# Patient Record
Sex: FEMALE | Race: OTHER | HISPANIC OR LATINO | URBAN - METROPOLITAN AREA
[De-identification: names, ages, dates, MRNs, and addresses within clinical notes are randomized per-mention and may not be internally consistent; named-entity substitution may affect disease eponyms.]

---

## 2021-08-17 ENCOUNTER — EMERGENCY (EMERGENCY)
Facility: HOSPITAL | Age: 41
LOS: 1 days | Discharge: HOME | End: 2021-08-17
Attending: EMERGENCY MEDICINE | Admitting: EMERGENCY MEDICINE
Payer: COMMERCIAL

## 2021-08-17 VITALS
RESPIRATION RATE: 17 BRPM | TEMPERATURE: 98 F | SYSTOLIC BLOOD PRESSURE: 104 MMHG | HEART RATE: 75 BPM | OXYGEN SATURATION: 99 % | DIASTOLIC BLOOD PRESSURE: 70 MMHG

## 2021-08-17 VITALS
OXYGEN SATURATION: 98 % | DIASTOLIC BLOOD PRESSURE: 75 MMHG | WEIGHT: 179.9 LBS | TEMPERATURE: 99 F | HEIGHT: 67 IN | SYSTOLIC BLOOD PRESSURE: 108 MMHG | RESPIRATION RATE: 18 BRPM | HEART RATE: 75 BPM

## 2021-08-17 DIAGNOSIS — R10.2 PELVIC AND PERINEAL PAIN: ICD-10-CM

## 2021-08-17 LAB
ALBUMIN SERPL ELPH-MCNC: 4.6 G/DL — SIGNIFICANT CHANGE UP (ref 3.3–5)
ALP SERPL-CCNC: 74 U/L — SIGNIFICANT CHANGE UP (ref 40–120)
ALT FLD-CCNC: 17 U/L — SIGNIFICANT CHANGE UP (ref 10–45)
ANION GAP SERPL CALC-SCNC: 10 MMOL/L — SIGNIFICANT CHANGE UP (ref 5–17)
APPEARANCE UR: CLEAR — SIGNIFICANT CHANGE UP
AST SERPL-CCNC: 18 U/L — SIGNIFICANT CHANGE UP (ref 10–40)
BACTERIA # UR AUTO: PRESENT /HPF
BASOPHILS # BLD AUTO: 0.04 K/UL — SIGNIFICANT CHANGE UP (ref 0–0.2)
BASOPHILS NFR BLD AUTO: 0.5 % — SIGNIFICANT CHANGE UP (ref 0–2)
BILIRUB SERPL-MCNC: 0.4 MG/DL — SIGNIFICANT CHANGE UP (ref 0.2–1.2)
BILIRUB UR-MCNC: NEGATIVE — SIGNIFICANT CHANGE UP
BUN SERPL-MCNC: 14 MG/DL — SIGNIFICANT CHANGE UP (ref 7–23)
CALCIUM SERPL-MCNC: 9.3 MG/DL — SIGNIFICANT CHANGE UP (ref 8.4–10.5)
CHLORIDE SERPL-SCNC: 103 MMOL/L — SIGNIFICANT CHANGE UP (ref 96–108)
CO2 SERPL-SCNC: 23 MMOL/L — SIGNIFICANT CHANGE UP (ref 22–31)
COLOR SPEC: YELLOW — SIGNIFICANT CHANGE UP
COMMENT - URINE: SIGNIFICANT CHANGE UP
CREAT SERPL-MCNC: 0.72 MG/DL — SIGNIFICANT CHANGE UP (ref 0.5–1.3)
DIFF PNL FLD: NEGATIVE — SIGNIFICANT CHANGE UP
EOSINOPHIL # BLD AUTO: 0.17 K/UL — SIGNIFICANT CHANGE UP (ref 0–0.5)
EOSINOPHIL NFR BLD AUTO: 2 % — SIGNIFICANT CHANGE UP (ref 0–6)
EPI CELLS # UR: ABNORMAL /HPF (ref 0–5)
GLUCOSE SERPL-MCNC: 99 MG/DL — SIGNIFICANT CHANGE UP (ref 70–99)
GLUCOSE UR QL: NEGATIVE — SIGNIFICANT CHANGE UP
HCG SERPL-ACNC: 95 MIU/ML — HIGH
HCT VFR BLD CALC: 35.3 % — SIGNIFICANT CHANGE UP (ref 34.5–45)
HGB BLD-MCNC: 11.7 G/DL — SIGNIFICANT CHANGE UP (ref 11.5–15.5)
IMM GRANULOCYTES NFR BLD AUTO: 0.6 % — SIGNIFICANT CHANGE UP (ref 0–1.5)
KETONES UR-MCNC: NEGATIVE — SIGNIFICANT CHANGE UP
LEUKOCYTE ESTERASE UR-ACNC: NEGATIVE — SIGNIFICANT CHANGE UP
LYMPHOCYTES # BLD AUTO: 2.18 K/UL — SIGNIFICANT CHANGE UP (ref 1–3.3)
LYMPHOCYTES # BLD AUTO: 25.7 % — SIGNIFICANT CHANGE UP (ref 13–44)
MCHC RBC-ENTMCNC: 28.8 PG — SIGNIFICANT CHANGE UP (ref 27–34)
MCHC RBC-ENTMCNC: 33.1 GM/DL — SIGNIFICANT CHANGE UP (ref 32–36)
MCV RBC AUTO: 86.9 FL — SIGNIFICANT CHANGE UP (ref 80–100)
MONOCYTES # BLD AUTO: 0.49 K/UL — SIGNIFICANT CHANGE UP (ref 0–0.9)
MONOCYTES NFR BLD AUTO: 5.8 % — SIGNIFICANT CHANGE UP (ref 2–14)
NEUTROPHILS # BLD AUTO: 5.55 K/UL — SIGNIFICANT CHANGE UP (ref 1.8–7.4)
NEUTROPHILS NFR BLD AUTO: 65.4 % — SIGNIFICANT CHANGE UP (ref 43–77)
NITRITE UR-MCNC: NEGATIVE — SIGNIFICANT CHANGE UP
NRBC # BLD: 0 /100 WBCS — SIGNIFICANT CHANGE UP (ref 0–0)
PH UR: 6 — SIGNIFICANT CHANGE UP (ref 5–8)
PLATELET # BLD AUTO: 334 K/UL — SIGNIFICANT CHANGE UP (ref 150–400)
POTASSIUM SERPL-MCNC: 4 MMOL/L — SIGNIFICANT CHANGE UP (ref 3.5–5.3)
POTASSIUM SERPL-SCNC: 4 MMOL/L — SIGNIFICANT CHANGE UP (ref 3.5–5.3)
PROT SERPL-MCNC: 8.1 G/DL — SIGNIFICANT CHANGE UP (ref 6–8.3)
PROT UR-MCNC: 30 MG/DL
RBC # BLD: 4.06 M/UL — SIGNIFICANT CHANGE UP (ref 3.8–5.2)
RBC # FLD: 12.9 % — SIGNIFICANT CHANGE UP (ref 10.3–14.5)
RBC CASTS # UR COMP ASSIST: < 5 /HPF — SIGNIFICANT CHANGE UP
SODIUM SERPL-SCNC: 136 MMOL/L — SIGNIFICANT CHANGE UP (ref 135–145)
SP GR SPEC: >=1.03 — SIGNIFICANT CHANGE UP (ref 1–1.03)
UROBILINOGEN FLD QL: 0.2 E.U./DL — SIGNIFICANT CHANGE UP
WBC # BLD: 8.48 K/UL — SIGNIFICANT CHANGE UP (ref 3.8–10.5)
WBC # FLD AUTO: 8.48 K/UL — SIGNIFICANT CHANGE UP (ref 3.8–10.5)
WBC UR QL: < 5 /HPF — SIGNIFICANT CHANGE UP

## 2021-08-17 PROCEDURE — 36415 COLL VENOUS BLD VENIPUNCTURE: CPT

## 2021-08-17 PROCEDURE — 85025 COMPLETE CBC W/AUTO DIFF WBC: CPT

## 2021-08-17 PROCEDURE — 99284 EMERGENCY DEPT VISIT MOD MDM: CPT | Mod: 25

## 2021-08-17 PROCEDURE — 87086 URINE CULTURE/COLONY COUNT: CPT

## 2021-08-17 PROCEDURE — 76801 OB US < 14 WKS SINGLE FETUS: CPT

## 2021-08-17 PROCEDURE — 76801 OB US < 14 WKS SINGLE FETUS: CPT | Mod: 26

## 2021-08-17 PROCEDURE — 99285 EMERGENCY DEPT VISIT HI MDM: CPT

## 2021-08-17 PROCEDURE — 84702 CHORIONIC GONADOTROPIN TEST: CPT

## 2021-08-17 PROCEDURE — 76830 TRANSVAGINAL US NON-OB: CPT

## 2021-08-17 PROCEDURE — 76817 TRANSVAGINAL US OBSTETRIC: CPT | Mod: 26

## 2021-08-17 PROCEDURE — 80053 COMPREHEN METABOLIC PANEL: CPT

## 2021-08-17 PROCEDURE — 81001 URINALYSIS AUTO W/SCOPE: CPT

## 2021-08-17 NOTE — ED PROVIDER NOTE - OBJECTIVE STATEMENT
41 F  p/w lower abd pain x 2 days.  She is c/o constant dull aching lower abd pain without radiation.  Also mild dysuria; no vaginal bleeding or discharge.  She reports missed menses so took 4 home preg tests which were positive.  LMP 7/5.  Denies f/c, headache, dizziness, fainting, chest pain, sob, nvd, constipation, hematuria, trauma.

## 2021-08-17 NOTE — ED PROVIDER NOTE - CARE PROVIDER_API CALL
Chavo Higginbotham)  OBGYN  [address], [place], [place] [address]  Phone: [phone]  Fax: [phone]  Follow Up Time:     Danae Coffman AND SOFIYA UP [facility]  [address] [address]  Phone: [phone]  Fax: [phone]  Follow Up Time:

## 2021-08-17 NOTE — ED ADULT TRIAGE NOTE - OTHER COMPLAINTS
patient c/o lower abdominal pain x 2 days, reports recent positive pregnancy test-- also c/o R ear pain --denies vaginal bleeding/spotting

## 2021-08-17 NOTE — ED PROVIDER NOTE - PROGRESS NOTE DETAILS
labs w/ hcg 95, otherwise wnl.  sonogram consistent with pregnancy of unknown location.  likely early preg based on hcg level.  pt pain free and no bleeding.  will have pt return in 48 hours for rpt quant.  discussed strict return parameters

## 2021-08-17 NOTE — ED PROVIDER NOTE - NSFOLLOWUPINSTRUCTIONS_ED_ALL_ED_FT
Please return in 2 days for repeat blood tests  Return sooner if you have worsening pain, vaginal bleeding or other concerns      Abdominal Pain in Pregnancy    WHAT YOU NEED TO KNOW:    Abdominal pain during pregnancy is common. Some of the causes include heartburn, constipation, gas, false labor, and round ligament pain. Round ligament pain is caused by stretching of the ligaments that support your uterus. Abdominal pain may be caused by a health problem, such as a stomach virus or appendicitis (inflammation of the appendix). The pain may also be caused by a problem with your pregnancy, such as a threatened miscarriage or  labor.    DISCHARGE INSTRUCTIONS:    Call your local emergency number (911 in the ) if:   •You have a fast heartbeat.      •You have shortness of breath.      •You feel lightheaded or faint.      Return to the emergency department if:   •You have sudden, severe pain or cramps that are so bad that you cannot walk or talk.      •You have vaginal bleeding or discharge.      •You have nausea, vomiting, fever, and severe pain on your right side.      Call your obstetrician if:   •You have light vaginal bleeding or spotting.      •You continue to have abdominal pain that cannot be relieved.      •You have a fever.      •You have questions or concerns about your condition or care.      Medicines: Ask your healthcare provider before you take any medicine during pregnancy, including over-the-counter pain medicines.  •Acetaminophen may be recommended. Ask how much to take and how often to take it. Follow directions. Acetaminophen can cause liver damage if not taken correctly. Do not use more than 4 grams (4,000 milligrams) total of acetaminophen in 1 day. Acetaminophen can cause liver damage. Your provider will tell you how much is safe to take each day during pregnancy. Too much medicine can be harmful to your baby. Read the labels of all other medicines you are using to see if they also contain acetaminophen, or ask your doctor or pharmacist.      •Take your medicine as directed. Contact your healthcare provider if you think your medicine is not helping or if you have side effects. Tell him or her if you are allergic to any medicine. Keep a list of the medicines, vitamins, and herbs you take. Include the amounts, and when and why you take them. Bring the list or the pill bottles to follow-up visits. Carry your medicine list with you in case of an emergency.      Self-care:   •Rest as needed. Rest may help to relieve pain. Your healthcare provider may recommend that you rest on your side instead of on your back. He or she may tell you to lie on your left side, if possible. Place a pillow under your abdomen. Keep another pillow between your knees. Ask your provider about other ways to relieve this pain, such as a supportive belt or pregnancy exercises.      •Do not lie flat in bed or bend over if you have heartburn. Ask your obstetrician if you should make any changes to the foods you eat. Ask if you can take any medicines for heartburn.  Prevent GERD           •Move slowly. Avoid quick changes in position or movements that cause pain.      •Exercise as directed. Gentle exercise can keep the ligaments loose and strengthen core (abdominal) muscles. An example is swimming, or a yoga program designed for pregnancy. Ask your healthcare provider which exercises are safe for you and how often to exercise. For most healthy women, a good goal is to try to get at least 30 minutes of exercise every day. If activity causes pain, try not to walk too long or too far at one time. Break your exercise up into short amounts.  Walking During Pregnancy           •Apply a warm compress to the area. Warmth can relieve pain and muscle spasms. Ask your healthcare provider if you can take a warm bath or use a heating pad. Keep all heat settings low. High heat can be dangerous for your baby. Do not sit in a hot tub or use hot water in your bath. You may also be able to massage the area gently while you are applying heat. Massage can help relieve pain.      •Eat more fiber and drink more liquids to relieve constipation. Fiber is found in fruits, vegetables, and whole-grain foods, such as whole-wheat bread and cereals. Ask how much liquid to drink each day and which liquids are best for you.             Follow up with your obstetrician within 3 days or as directed: Write down your questions so you remember to ask them during your visits.

## 2021-08-17 NOTE — ED ADULT NURSE NOTE - OBJECTIVE STATEMENT
Pt states "I am pregnant and I have been having pain for about 2 days". Pt points to pelvic area, no bleeding, leaking, fever, chills but c/o of some dysuria

## 2021-08-17 NOTE — ED PROVIDER NOTE - CLINICAL SUMMARY MEDICAL DECISION MAKING FREE TEXT BOX
41 F  p/w lower abd pain x 2 days, reports 4 positive home preg tests, LMP /.  also w/ dysuria- no vaginal bleeding or discharge.  pt well appearing, abd soft, nontender.  ucg +.  consider ectopic preg, ovarian cyst, uti.  will obtain labs, sonogram and reassess

## 2021-08-17 NOTE — ED PROVIDER NOTE - ATTENDING CONTRIBUTION TO CARE
+ pregnancy at home with lower abdominal discomfort intermittent x 2 days. abdomen soft, non tender, well appearing, no guarding. hcg very low. us without visualized iup/eup. to return in 2 days for repeat hcg/ possible us. r/o ectopic vs threatened  vs early iup

## 2021-08-17 NOTE — ED PROVIDER NOTE - CARE PROVIDERS DIRECT ADDRESSES
[email or web address]

## 2021-08-17 NOTE — ED PROVIDER NOTE - PHYSICAL EXAMINATION
Vitals reviewed  Gen: well appearing, nad, speaking in full sentences  Skin: wwp, no rash/lesions  HEENT: ncat, eomi, mmm  CV: rrr, no audible m/r/g  Resp: symmetrical expansion, ctab, no w/r/r  Abd: nondistended, + csection scar healed, soft, nontender, no rebound/guarding, no cvat  Ext: FROM throughout, no peripheral edema  Neuro: alert/oriented, no focal deficits, steady gait

## 2021-08-17 NOTE — ED PROVIDER NOTE - PATIENT PORTAL LINK FT
You can access the FollowMyHealth Patient Portal offered by [facility] by registering at the following website: [email or web address]. By joining [employer]’s FollowMyHealth portal, you will also be able to view your health information using other applications (apps) compatible with our system.

## 2021-08-18 LAB
CULTURE RESULTS: SIGNIFICANT CHANGE UP
SPECIMEN SOURCE: SIGNIFICANT CHANGE UP

## 2022-03-16 PROBLEM — Z00.00 ENCOUNTER FOR PREVENTIVE HEALTH EXAMINATION: Status: ACTIVE | Noted: 2022-03-16

## 2023-12-11 PROBLEM — Z78.9 OTHER SPECIFIED HEALTH STATUS: Chronic | Status: ACTIVE | Noted: 2021-08-17

## 2023-12-22 NOTE — ED PROVIDER NOTE - NS ED ROS FT
Addended by: PAUL SIERRA on: 12/22/2023 01:54 PM     Modules accepted: Orders     ROS as per HPI, rest 10pt ROS reviewed and negative

## 2023-12-26 ENCOUNTER — APPOINTMENT (OUTPATIENT)
Dept: ORTHOPEDIC SURGERY | Facility: CLINIC | Age: 43
End: 2023-12-26
Payer: MEDICAID

## 2023-12-26 VITALS — WEIGHT: 185 LBS | HEIGHT: 67 IN | BODY MASS INDEX: 29.03 KG/M2

## 2023-12-26 DIAGNOSIS — M89.8X1 OTHER SPECIFIED DISORDERS OF BONE, SHOULDER: ICD-10-CM

## 2023-12-26 DIAGNOSIS — M75.52 BURSITIS OF LEFT SHOULDER: ICD-10-CM

## 2023-12-26 PROCEDURE — 99203 OFFICE O/P NEW LOW 30 MIN: CPT

## 2023-12-26 PROCEDURE — 73030 X-RAY EXAM OF SHOULDER: CPT | Mod: LT

## 2023-12-26 PROCEDURE — 72040 X-RAY EXAM NECK SPINE 2-3 VW: CPT

## 2023-12-26 RX ORDER — NABUMETONE 500 MG/1
500 TABLET, FILM COATED ORAL
Qty: 60 | Refills: 1 | Status: ACTIVE | COMMUNITY
Start: 2023-12-26 | End: 1900-01-01

## 2023-12-26 RX ORDER — METAXALONE 800 MG/1
800 TABLET ORAL 3 TIMES DAILY
Qty: 30 | Refills: 1 | Status: ACTIVE | COMMUNITY
Start: 2023-12-26 | End: 1900-01-01

## 2023-12-26 NOTE — HISTORY OF PRESENT ILLNESS
[de-identified] : Initial Visit for left shoulder pain Duration: 1 month Prior Studies: x rays ordered Medical Hx: none Surgical Hx: none Aggravating FX: lifting/ pushing/ extra weight  Symptoms: pain radiating from shoulder down left arm Pain Level:8/10 Alleviating FX: rest / heat/ stretching Pain Medication: none Current Med: vitamins Allergies: NKA

## 2023-12-26 NOTE — PHYSICAL EXAM
[de-identified] : Left Shoulder:  Constitutional: The patient is healthy-appearing and in no apparent distress.   Cardiovascular System:  The capillary refill is less than 2 seconds.   Skin:  There are no skin abnormalities.  C-Spine/Neck:  Active Range of Motion: Flexion				50 Extension			60 Lateral rotation			80    Left Shoulder:  Inspection:  There is no atrophy, erythema, warmth, swelling. There is no scapular winging. There is no AC prominence.   Bony Palpation:  There is no tenderness of the clavicle. There is no tenderness of the acromioclavicular joint. There is no tenderness of the greater tuberosity.  There is no tenderness of the bicipital groove.   Soft Tissue Palpation:  There is tenderness of the trapezius. There is tenderness of the rhomboid. There is no tenderness of the subacromial bursa.   Active Range of Motion:  Forward flexion- 				180  Abduction-					150 External rotation at 0 degrees abduction-	80  Internal rotation at 0 degrees abduction-	80  Passive Range of Motion:  Forward flexion- 			180  Abduction-				150 External rotation at 0 deg abduction-	80  Internal rotation at 0 deg abduction-	80  Strength: Supraspinatus / Abduction                  5/5 External rotation                                 5/5 Internal roation                                   5/5  Special Tests:  Hawkin's  				Positive  Neer's  				Positive  Speed's  				Negative AC cross-over 			            Negative [place]'s  				Negative  Neurological System:   There is normal sensation to light touch C5-T1.   Stability:  There is no general laxity.   Psychiatric:  The patient demonstrates a normal mood and affect and is active and alert [de-identified] : X-ray left shoulder: There is no significant bony / soft tissue abnormality, arthritis, or fracture. X-ray cervical spine: There is no significant bony / soft tissue abnormality, arthritis, or fracture except loss of lordosis

## 2023-12-26 NOTE — ASSESSMENT
[FreeTextEntry1] : Discussed at length with patient exam history and imaging and the underlying cervical as well as physical exam consistent with shoulder origin and she elects home exercises physical therapy anti-inflammatory muscle relaxer today.  If no improvement consideration to cortisone injection of the shoulder and ultimately persistent symptoms MRI evaluation

## 2024-10-17 NOTE — ED ADULT NURSE NOTE - OTHER COMPLAINTS
patient c/o lower abdominal pain x 2 days, reports recent positive pregnancy test-- also c/o R ear pain --denies vaginal bleeding/spotting
I personally spent